# Patient Record
Sex: FEMALE | Race: AMERICAN INDIAN OR ALASKA NATIVE
[De-identification: names, ages, dates, MRNs, and addresses within clinical notes are randomized per-mention and may not be internally consistent; named-entity substitution may affect disease eponyms.]

---

## 2021-04-15 ENCOUNTER — HOSPITAL ENCOUNTER (EMERGENCY)
Dept: HOSPITAL 5 - ED | Age: 31
LOS: 1 days | Discharge: HOME | End: 2021-04-16
Payer: COMMERCIAL

## 2021-04-15 DIAGNOSIS — R31.9: ICD-10-CM

## 2021-04-15 DIAGNOSIS — Z79.899: ICD-10-CM

## 2021-04-15 DIAGNOSIS — N39.0: Primary | ICD-10-CM

## 2021-04-15 DIAGNOSIS — Z79.2: ICD-10-CM

## 2021-04-15 PROCEDURE — 96375 TX/PRO/DX INJ NEW DRUG ADDON: CPT

## 2021-04-15 PROCEDURE — 96365 THER/PROPH/DIAG IV INF INIT: CPT

## 2021-04-15 PROCEDURE — 85025 COMPLETE CBC W/AUTO DIFF WBC: CPT

## 2021-04-15 PROCEDURE — 74176 CT ABD & PELVIS W/O CONTRAST: CPT

## 2021-04-15 PROCEDURE — 81025 URINE PREGNANCY TEST: CPT

## 2021-04-15 PROCEDURE — 81001 URINALYSIS AUTO W/SCOPE: CPT

## 2021-04-15 PROCEDURE — 99284 EMERGENCY DEPT VISIT MOD MDM: CPT

## 2021-04-15 PROCEDURE — 36415 COLL VENOUS BLD VENIPUNCTURE: CPT

## 2021-04-15 PROCEDURE — 80048 BASIC METABOLIC PNL TOTAL CA: CPT

## 2021-04-15 PROCEDURE — 87086 URINE CULTURE/COLONY COUNT: CPT

## 2021-04-16 VITALS — SYSTOLIC BLOOD PRESSURE: 124 MMHG | DIASTOLIC BLOOD PRESSURE: 70 MMHG

## 2021-04-16 LAB
BASOPHILS # (AUTO): 0 K/MM3 (ref 0–0.1)
BASOPHILS NFR BLD AUTO: 0.6 % (ref 0–1.8)
BILIRUB UR QL STRIP: (no result)
BLOOD UR QL VISUAL: (no result)
BUN SERPL-MCNC: 9 MG/DL (ref 7–17)
BUN/CREAT SERPL: 11 %
CALCIUM SERPL-MCNC: 8.8 MG/DL (ref 8.4–10.2)
EOSINOPHIL # BLD AUTO: 0.1 K/MM3 (ref 0–0.4)
EOSINOPHIL NFR BLD AUTO: 1.9 % (ref 0–4.3)
HCT VFR BLD CALC: 31.4 % (ref 30.3–42.9)
HEMOLYSIS INDEX: 10
HGB BLD-MCNC: 10.9 GM/DL (ref 10.1–14.3)
LYMPHOCYTES # BLD AUTO: 0.4 K/MM3 (ref 1.2–5.4)
LYMPHOCYTES NFR BLD AUTO: 58.3 % (ref 13.4–35)
MCHC RBC AUTO-ENTMCNC: 35 % (ref 30–34)
MCV RBC AUTO: 84 FL (ref 79–97)
MONOCYTES # (AUTO): 0.1 K/MM3 (ref 0–0.8)
MONOCYTES % (AUTO): 7.6 % (ref 0–7.3)
PH UR STRIP: 7 [PH] (ref 5–7)
PLATELET # BLD: 261 K/MM3 (ref 140–440)
RBC # BLD AUTO: 3.76 M/MM3 (ref 3.65–5.03)
RBC #/AREA URNS HPF: > 182 /HPF (ref 0–6)
UROBILINOGEN UR-MCNC: < 2 MG/DL (ref ?–2)
WBC #/AREA URNS HPF: 89 /HPF (ref 0–6)

## 2021-04-16 NOTE — EMERGENCY DEPARTMENT REPORT
ED General Adult HPI





- General


Chief complaint: Back Pain/Injury


Stated complaint: BLOOD IN URINE


Time Seen by Provider: 21 03:32


Source: patient


Mode of arrival: Ambulatory


Limitations: No Limitations





- History of Present Illness


Initial comments: 


Patient presents for hematuria and right flank pain status post elbow to kidney 

area  3 days ago plan basketball.  Patient denies fevers or chills, no nausea 

vomiting, no other vaginal discharge last menstrual cycle was 3/29/2021.  

Patient states pain is 4/10 exacerbated by movement, denies dysuria frequency or

urgency however there is intermittent hematuria.


Severity scale (0 -10): 0





- Related Data


                                  Previous Rx's











 Medication  Instructions  Recorded  Last Taken  Type


 


levoFLOXacin [Levaquin TAB] 500 mg PO QDAY 10 Days #10 tablet 21 Unknown 

Rx


 


traMADoL [Ultram] 50 mg PO Q6HR PRN #12 tablet 21 Unknown Rx











                                    Allergies











Allergy/AdvReac Type Severity Reaction Status Date / Time


 


No Known Allergies Allergy   Unverified 21 00:43














ED Review of Systems


ROS: 


Stated complaint: BLOOD IN URINE


Other details as noted in HPI





Constitutional: denies: chills, fever


Eyes: denies: eye pain, eye discharge, vision change


ENT: denies: ear pain, throat pain


Respiratory: denies: cough, shortness of breath, wheezing


Cardiovascular: denies: chest pain, palpitations


Endocrine: no symptoms reported


Gastrointestinal: abdominal pain (right flank tenderness ).  denies: nausea, vom

iting, diarrhea


Genitourinary: hematuria.  denies: urgency, dysuria, frequency, discharge


Musculoskeletal: denies: back pain, joint swelling, arthralgia


Skin: denies: rash, lesions


Neurological: denies: headache, weakness, paresthesias


Psychiatric: denies: anxiety, depression


Hematological/Lymphatic: denies: easy bleeding, easy bruising





ED Past Medical Hx





- Past Medical History


Previous Medical History?: No





- Surgical History


Past Surgical History?: No





- Medications


Home Medications: 


                                Home Medications











 Medication  Instructions  Recorded  Confirmed  Last Taken  Type


 


levoFLOXacin [Levaquin TAB] 500 mg PO QDAY 10 Days #10 tablet 21  Unknown 

Rx


 


traMADoL [Ultram] 50 mg PO Q6HR PRN #12 tablet 21  Unknown Rx














ED Physical Exam





- General


Limitations: No Limitations


General appearance: alert, in no apparent distress





- Head


Head exam: Present: atraumatic, normocephalic





- Eye


Eye exam: Present: normal appearance, EOMI





- ENT


ENT exam: Present: mucous membranes moist





- Neck


Neck exam: Present: normal inspection, full ROM.  Absent: tenderness





- Respiratory


Respiratory exam: Present: normal lung sounds bilaterally.  Absent: respiratory 

distress, wheezes





- Cardiovascular


Cardiovascular Exam: Present: regular rate, normal rhythm, normal heart sounds. 

 Absent: systolic murmur, diastolic murmur, rubs, gallop





- GI/Abdominal


GI/Abdominal exam: Present: soft, tenderness (right flank ), normal bowel 

sounds.  Absent: distended, guarding, rebound, rigid, bruit, hernia





- Rectal


Rectal exam: Present: deferred





- Extremities Exam


Extremities exam: Present: normal inspection, full ROM, normal capillary refill.

  Absent: tenderness





- Back Exam


Back exam: Present: normal inspection, full ROM, CVA tenderness (R).  Absent: 

tenderness, CVA tenderness (L)





- Neurological Exam


Neurological exam: Present: alert, oriented X3, CN II-XII intact, normal gait





- Psychiatric


Psychiatric exam: Present: normal affect, normal mood





- Skin


Skin exam: Present: warm, dry, intact, normal color.  Absent: rash





ED Course





                                   Vital Signs











  21





  00:51


 


Temperature 98.2 F


 


Pulse Rate 79


 


Respiratory 18





Rate 


 


Blood Pressure 124/70


 


O2 Sat by Pulse 100





Oximetry 














ED Medical Decision Making





- Lab Data


Result diagrams: 


                                 21 01:04





                                 21 01:04








Labs











  21





  01:04 01:04 Unknown


 


WBC  5.9  


 


RBC  3.76  


 


Hgb  10.9  


 


Hct  31.4  


 


MCV  84  


 


MCH  29  


 


MCHC  35 H  


 


RDW  15.3 H  


 


Plt Count  261  


 


Lymph % (Auto)  58.3 H  


 


Mono % (Auto)  7.6 H  


 


Eos % (Auto)  1.9  


 


Baso % (Auto)  0.6  


 


Lymph # (Auto)  0.4 L  


 


Mono # (Auto)  0.1  


 


Eos # (Auto)  0.1  


 


Baso # (Auto)  0.0  


 


Seg Neutrophils %  31.6 L  


 


Seg Neutrophils #  3.4  


 


Sodium   139 


 


Potassium   4.1 


 


Chloride   102.8 


 


Carbon Dioxide   27 


 


Anion Gap   13 


 


BUN   9 


 


Creatinine   0.8 


 


Estimated GFR   > 60 


 


BUN/Creatinine Ratio   11 


 


Glucose   94 


 


Calcium   8.8 


 


Urine Color    Red


 


Urine Turbidity    Slightly-cloudy


 


Urine pH    7.0


 


Ur Specific [place]    1.011


 


Urine Protein    100 mg/dl


 


Urine Glucose (UA)    Neg


 


Urine Ketones    Neg


 


Urine Blood    Lg


 


Urine Nitrite    Neg


 


Urine Bilirubin    Neg


 


Urine Urobilinogen    < 2.0


 


Ur Leukocyte Esterase    Neg


 


Urine WBC (Auto)    89.0 H


 


Urine RBC (Auto)    > 182.0


 


U Epithel Cells (Auto)    6.0


 


Urine HCG, Qual    Negative














- Radiology Data


Radiology results: report reviewed, image reviewed


Signed Patient: DANNIELLE BIANCHI MR#:  [id number] 


: 1990 


Acct:[id number] Age/Sex: 30 / F ADM


 Date: 04/15/21 Loc: ED Attending Dr: Ordering Physician: ODILIA KWON NP

 


Date of Service: 21


 Procedure(s): CT abdomen pelvis wo con 


Accession Number(s): [id number] cc: ODILIA KWON NP 


CT ABDOMEN AND PELVIS WITHOUT IV CONTRAST


 INDICATION: possible renal stone(s) / Pyelonephritis "Gross" hematuria seen at 

home.. 


COMPARISON: None available. 


TECHNIQUE: All CT scans at this facility use dose modulation, automated exposure

 control, iterative reconstruction or weight based dosing, when appropriate, to 

reduce radiation dose to as low as reasonably achievable. 


FINDINGS: Lung Bases: No significant abnormality. Skeletal System: No acute 

abnormality.


 ABDOMEN: Liver: No significant abnormality. Gallbladder: No significant 

abnormality. Bile Ducts: No significant abnormality. Pancreas: No significant 

abnormality. Spleen: No significant abnormality. Adrenals: No significant 

abnormality. Right Kidney: No significant abnormality.


 Left Kidney: No significant abnormality. 


Upper GI tract: No significant abnormality. 


Lymph Nodes: No significant adenopathy. 


Aorta: No significant abnormality. 


Additional Findings: No significant abnormality.


 PELVIS: Colon: No acute abnormality. 


Urinary Bladder and Distal Ureters: No significant abnormality. Appendix: No 

significant abnormality. Lymph Nodes: No significant adenopathy. Additional 

Findings: Trace free fluid in the cul-de-sac is likely physiologic. 


IMPRESSION: 1. Within the limitations of non contrast technique, no acute 

process in the abdomen or pelvis.


 Signer Name: Lex Mccoy MD Signed: 2021 4:16 AM


 Workstation Name: [id number] 


Transcribed By: ZACH Dictated By: Lex Mccoy MD 


Electronically Authenticated By: Lex Mccoy MD 


Signed Date/Time: 21


 DD/DT: 21 TD/TT: 


Print Cancel 








- Medical Decision Making


CT abdomen and pelvis is normal no soft tissue abnormality no mass, UA positive 

for white blood cells and blood , there is mild CVA tenderness.  Patient advises

 pain is relieved with medications given in ED including IV fluids NSAIDs first 

dose antibiotic.  Will DC to home with treatment for UTI.  Patient will follow-

up with urology in 2 to 3 days.  For  dx hematuria.  Patient verbalized 

agreement of understanding and discharge plan.





Critical care attestation.: 


If time is entered above; I have spent that time in minutes in the direct care 

of this critically ill patient, excluding procedure time.








ED Disposition


Clinical Impression: 


Hematuria


Qualifiers:


 Hematuria type: unspecified type Qualified Code(s): R31.9 - Hematuria, 

unspecified





UTI (urinary tract infection)


Qualifiers:


 Urinary tract infection type: acute cystitis Hematuria presence: with hematuria

 Qualified Code(s): N30.01 - Acute cystitis with hematuria





Disposition: DC-01 TO HOME OR SELFCARE


Is pt being admited?: No


Does the pt Need Aspirin: No


Condition: Stable


Instructions:  Urinary Tract Infection, Adult, Hematuria, Adult


Additional Instructions: 


follow up with urology in 2-3 for bloody urine persists. Return to emergency if 

symptoms worsen. 


Prescriptions: 


levoFLOXacin [Levaquin TAB] 500 mg PO QDAY 10 Days #10 tablet


traMADoL [Ultram] 50 mg PO Q6HR PRN #12 tablet


 PRN Reason: Pain


Referrals: 


DANIELLE OSORIO MD [Staff Physician] - 3-5 Days


[facility] [Provider Group] - 3-5 Days


Forms:  Work/School Release Form(ED)


Time of Disposition: 04:54

## 2021-04-16 NOTE — CAT SCAN REPORT
CT ABDOMEN AND PELVIS WITHOUT IV CONTRAST



INDICATION:

 possible renal stone(s) / Pyelonephritis "Gross" hematuria seen at home..



COMPARISON:

None available.



TECHNIQUE:

All CT scans at this facility use dose modulation, automated exposure control, iterative reconstructi
on or weight based dosing, when appropriate, to reduce radiation dose to as low as reasonably achieva
ble.



FINDINGS:



Lung Bases: No significant abnormality.



Skeletal System: No acute abnormality.



ABDOMEN:

Liver: No significant abnormality.

Gallbladder: No significant abnormality.  

Bile Ducts: No significant abnormality.

Pancreas: No significant abnormality.

Spleen: No significant abnormality.

Adrenals: No significant abnormality.

Right Kidney: No significant abnormality.

Left Kidney: No significant abnormality.

Upper GI tract: No significant abnormality.

Lymph Nodes: No significant adenopathy.

Aorta: No significant abnormality. 

Additional Findings: No significant abnormality.



PELVIS:

Colon: No acute abnormality.

Urinary Bladder and Distal Ureters: No significant abnormality.

Appendix: No significant abnormality.  

Lymph Nodes: No significant adenopathy.

Additional Findings: Trace free fluid in the cul-de-sac is likely physiologic.





IMPRESSION:

1.  Within the limitations of non contrast technique, no acute process in the abdomen or pelvis.



Signer Name: Lex Mccoy MD 

Signed: 4/16/2021 4:16 AM

Workstation Name: [id number]